# Patient Record
Sex: FEMALE | Race: BLACK OR AFRICAN AMERICAN | NOT HISPANIC OR LATINO | Employment: FULL TIME | URBAN - METROPOLITAN AREA
[De-identification: names, ages, dates, MRNs, and addresses within clinical notes are randomized per-mention and may not be internally consistent; named-entity substitution may affect disease eponyms.]

---

## 2019-09-27 PROBLEM — M54.9 UPPER BACK PAIN ON LEFT SIDE: Status: ACTIVE | Noted: 2018-06-05

## 2019-09-27 PROBLEM — Z01.818 ENCOUNTER FOR OTHER PREPROCEDURAL EXAMINATION: Status: ACTIVE | Noted: 2018-06-21

## 2019-09-27 PROBLEM — H25.13 NUCLEAR SCLEROTIC CATARACT OF BOTH EYES: Status: ACTIVE | Noted: 2018-04-24

## 2019-09-27 PROBLEM — M54.12 RADICULOPATHY, CERVICAL REGION: Status: ACTIVE | Noted: 2019-09-27

## 2019-09-27 PROBLEM — E11.3592 PROLIFERATIVE DIABETIC RETINOPATHY OF LEFT EYE WITHOUT MACULAR EDEMA ASSOCIATED WITH TYPE 2 DIABETES MELLITUS: Status: ACTIVE | Noted: 2018-04-24

## 2019-09-27 PROBLEM — R42 DIZZINESS: Status: ACTIVE | Noted: 2017-08-01

## 2019-09-27 PROBLEM — M12.811 ROTATOR CUFF ARTHROPATHY, RIGHT: Status: ACTIVE | Noted: 2017-03-10

## 2019-11-04 PROBLEM — M60.251: Status: ACTIVE | Noted: 2019-11-04

## 2020-03-09 PROBLEM — H25.811 COMBINED FORMS OF AGE-RELATED CATARACT, RIGHT EYE: Status: ACTIVE | Noted: 2020-03-09

## 2021-12-02 PROBLEM — R42 DIZZINESS: Status: RESOLVED | Noted: 2017-08-01 | Resolved: 2021-12-02

## 2021-12-02 PROBLEM — E11.65 UNCONTROLLED TYPE 2 DIABETES MELLITUS WITH HYPERGLYCEMIA: Status: ACTIVE | Noted: 2021-12-02

## 2021-12-02 PROBLEM — Q07.00 ARNOLD-CHIARI MALFORMATION: Status: ACTIVE | Noted: 2021-12-02

## 2021-12-02 PROBLEM — Z01.818 ENCOUNTER FOR OTHER PREPROCEDURAL EXAMINATION: Status: RESOLVED | Noted: 2018-06-21 | Resolved: 2021-12-02

## 2022-07-22 PROBLEM — Z98.890 STATUS POST CRANIOTOMY: Status: ACTIVE | Noted: 2022-07-22

## 2022-08-01 PROBLEM — E87.6 HYPOKALEMIA: Status: ACTIVE | Noted: 2022-08-01

## 2022-08-01 PROBLEM — G51.0 FACIAL PALSY: Status: ACTIVE | Noted: 2022-08-01

## 2022-11-16 PROBLEM — I63.9 CEREBROVASCULAR ACCIDENT (CVA): Status: ACTIVE | Noted: 2022-11-16

## 2022-12-30 PROBLEM — M54.2 NECK PAIN: Status: ACTIVE | Noted: 2022-12-30

## 2022-12-30 PROBLEM — M06.38 RHEUMATOID PANNUS OF CERVICAL SPINE: Status: ACTIVE | Noted: 2022-12-30

## 2022-12-31 PROBLEM — N17.9 AKI (ACUTE KIDNEY INJURY): Status: ACTIVE | Noted: 2022-12-31

## 2023-02-06 LAB — HM COLONOSCOPY: NORMAL

## 2023-04-03 PROBLEM — I63.9 CEREBROVASCULAR ACCIDENT (CVA): Status: RESOLVED | Noted: 2022-11-16 | Resolved: 2023-04-03

## 2023-04-03 PROBLEM — N17.9 AKI (ACUTE KIDNEY INJURY): Status: RESOLVED | Noted: 2022-12-31 | Resolved: 2023-04-03

## 2023-04-20 ENCOUNTER — PATIENT OUTREACH (OUTPATIENT)
Dept: ADMINISTRATIVE | Facility: HOSPITAL | Age: 63
End: 2023-04-20
Payer: COMMERCIAL

## 2023-04-20 DIAGNOSIS — E11.65 UNCONTROLLED TYPE 2 DIABETES MELLITUS WITH HYPERGLYCEMIA: Primary | ICD-10-CM

## 2023-05-03 ENCOUNTER — PATIENT OUTREACH (OUTPATIENT)
Dept: ADMINISTRATIVE | Facility: HOSPITAL | Age: 63
End: 2023-05-03
Payer: COMMERCIAL

## 2023-08-08 ENCOUNTER — PATIENT OUTREACH (OUTPATIENT)
Dept: ADMINISTRATIVE | Facility: HOSPITAL | Age: 63
End: 2023-08-08

## 2023-08-24 ENCOUNTER — PATIENT OUTREACH (OUTPATIENT)
Dept: ADMINISTRATIVE | Facility: HOSPITAL | Age: 63
End: 2023-08-24

## 2023-08-25 PROBLEM — B35.1 ONYCHOMYCOSIS: Status: ACTIVE | Noted: 2023-08-25

## 2023-08-25 PROBLEM — R05.9 COUGH: Status: ACTIVE | Noted: 2023-08-25

## 2023-08-25 PROBLEM — M19.041 LOCALIZED OSTEOARTHRITIS OF RIGHT HAND: Status: ACTIVE | Noted: 2023-08-25

## 2023-12-01 PROBLEM — R80.9 MICROALBUMINURIA: Status: ACTIVE | Noted: 2023-12-01

## 2023-12-01 PROBLEM — G56.03 BILATERAL CARPAL TUNNEL SYNDROME: Status: ACTIVE | Noted: 2023-12-01

## 2023-12-01 PROBLEM — E04.2 MULTIPLE THYROID NODULES: Status: ACTIVE | Noted: 2023-12-01

## 2023-12-01 PROBLEM — E61.1 IRON DEFICIENCY: Status: ACTIVE | Noted: 2023-12-01

## 2024-01-29 ENCOUNTER — CLINICAL SUPPORT (OUTPATIENT)
Dept: SMOKING CESSATION | Facility: CLINIC | Age: 64
End: 2024-01-29

## 2024-01-29 DIAGNOSIS — F17.200 NICOTINE DEPENDENCE: Primary | ICD-10-CM

## 2024-01-29 NOTE — PROGRESS NOTES
Pt came in early due to working out front.  Pt smokes 4-5 cpd and at certain times of the day. She has surgery coming up and would like to be quit one month from surgery date of 3/12. She will give up her night time cig immediately. No NRT, just candy or gum to substitute.

## 2024-02-21 ENCOUNTER — TELEPHONE (OUTPATIENT)
Dept: SMOKING CESSATION | Facility: CLINIC | Age: 64
End: 2024-02-21
Payer: COMMERCIAL

## 2024-04-25 ENCOUNTER — SOCIAL WORK (OUTPATIENT)
Dept: ADMINISTRATIVE | Facility: OTHER | Age: 64
End: 2024-04-25
Payer: COMMERCIAL

## 2024-04-25 NOTE — PROGRESS NOTES
Consult received reg pt's request for inpt rehab post shoulder surgery on 5/3.  SW placed call to the Workman's Comp adjustor Nicky Pena, [phone].  Called pt who stated this is not a workman's comp case and will be billed to her regular insurance.  Discussed various local rehabs and she stated she's had some experience with [facility] and would like to try there if in network.  Per Jenny, they accept pt's insurance.  Faxed recent clinicals to them at [phone].  Called pt and updated her.  Once surgery is completed, remainder of clinicals can be sent to finalize placement.  SW will con't to follow and assist with arrangements.    Chikis Ferguson, SW  Ext [phone]      Notified that pt's surgery is cancelled.  Updated Jenny at [facility] rehab.

## 2024-05-15 PROBLEM — R42 VERTIGO: Status: ACTIVE | Noted: 2024-05-15

## 2024-08-14 ENCOUNTER — PATIENT OUTREACH (OUTPATIENT)
Dept: ADMINISTRATIVE | Facility: HOSPITAL | Age: 64
End: 2024-08-14
Payer: COMMERCIAL

## 2024-08-14 DIAGNOSIS — E11.65 UNCONTROLLED TYPE 2 DIABETES MELLITUS WITH HYPERGLYCEMIA: ICD-10-CM

## 2024-08-14 DIAGNOSIS — Z12.31 BREAST CANCER SCREENING BY MAMMOGRAM: Primary | ICD-10-CM

## 2024-09-05 ENCOUNTER — PATIENT OUTREACH (OUTPATIENT)
Dept: ADMINISTRATIVE | Facility: HOSPITAL | Age: 64
End: 2024-09-05
Payer: COMMERCIAL

## 2024-09-24 ENCOUNTER — PATIENT OUTREACH (OUTPATIENT)
Dept: ADMINISTRATIVE | Facility: HOSPITAL | Age: 64
End: 2024-09-24
Payer: COMMERCIAL

## 2024-09-24 DIAGNOSIS — E11.65 UNCONTROLLED TYPE 2 DIABETES MELLITUS WITH HYPERGLYCEMIA: Primary | ICD-10-CM

## 2024-09-26 ENCOUNTER — PATIENT OUTREACH (OUTPATIENT)
Dept: ADMINISTRATIVE | Facility: HOSPITAL | Age: 64
End: 2024-09-26
Payer: COMMERCIAL

## 2024-10-08 ENCOUNTER — PATIENT OUTREACH (OUTPATIENT)
Dept: ADMINISTRATIVE | Facility: HOSPITAL | Age: 64
End: 2024-10-08
Payer: COMMERCIAL

## 2025-01-16 ENCOUNTER — CLINICAL SUPPORT (OUTPATIENT)
Dept: SMOKING CESSATION | Facility: CLINIC | Age: 65
End: 2025-01-16

## 2025-01-16 DIAGNOSIS — F17.200 NICOTINE DEPENDENCE: Primary | ICD-10-CM

## 2025-01-16 PROCEDURE — 99407 BEHAV CHNG SMOKING > 10 MIN: CPT | Mod: ,,, | Performed by: GENERAL PRACTICE

## 2025-01-16 PROCEDURE — 99999 PR PBB SHADOW E&M-EST. PATIENT-LVL I: CPT | Mod: PBBFAC,,,

## 2025-01-17 NOTE — PROGRESS NOTES
Spoke with patient today in regard to smoking cessation progress 3/6/12 month telephone follow up, she states that she is not tobacco free.  Patient states she trimmed down to 0-3 cigarettes daily.   Informed patient of benefit period, future follow up, and contact information if any further help or support is needed.  Patient not ready to schedule appointment at this time.  Will complete smart form for 3/6/12 month follow up on Quit attempt #1 and resolve episode.